# Patient Record
Sex: MALE | Race: WHITE | NOT HISPANIC OR LATINO | Employment: OTHER | URBAN - METROPOLITAN AREA
[De-identification: names, ages, dates, MRNs, and addresses within clinical notes are randomized per-mention and may not be internally consistent; named-entity substitution may affect disease eponyms.]

---

## 2021-05-31 ENCOUNTER — RECORDS - HEALTHEAST (OUTPATIENT)
Dept: ADMINISTRATIVE | Facility: CLINIC | Age: 71
End: 2021-05-31

## 2023-10-25 ENCOUNTER — APPOINTMENT (OUTPATIENT)
Dept: GENERAL RADIOLOGY | Facility: CLINIC | Age: 73
End: 2023-10-25
Attending: STUDENT IN AN ORGANIZED HEALTH CARE EDUCATION/TRAINING PROGRAM
Payer: COMMERCIAL

## 2023-10-25 ENCOUNTER — HOSPITAL ENCOUNTER (EMERGENCY)
Facility: CLINIC | Age: 73
Discharge: HOME | End: 2023-10-25
Attending: STUDENT IN AN ORGANIZED HEALTH CARE EDUCATION/TRAINING PROGRAM | Admitting: STUDENT IN AN ORGANIZED HEALTH CARE EDUCATION/TRAINING PROGRAM
Payer: COMMERCIAL

## 2023-10-25 VITALS
DIASTOLIC BLOOD PRESSURE: 93 MMHG | TEMPERATURE: 99.6 F | BODY MASS INDEX: 27.1 KG/M2 | OXYGEN SATURATION: 99 % | SYSTOLIC BLOOD PRESSURE: 156 MMHG | HEIGHT: 75 IN | HEART RATE: 118 BPM | RESPIRATION RATE: 14 BRPM | WEIGHT: 218 LBS

## 2023-10-25 DIAGNOSIS — S62.639B OPEN FRACTURE OF TUFT OF DISTAL PHALANX OF FINGER: ICD-10-CM

## 2023-10-25 DIAGNOSIS — S61.212A LACERATION OF RIGHT MIDDLE FINGER WITHOUT FOREIGN BODY WITHOUT DAMAGE TO NAIL, INITIAL ENCOUNTER: ICD-10-CM

## 2023-10-25 PROCEDURE — 73130 X-RAY EXAM OF HAND: CPT | Mod: RT

## 2023-10-25 PROCEDURE — 26765 TREAT FINGER FRACTURE EACH: CPT | Mod: F7

## 2023-10-25 PROCEDURE — 250N000013 HC RX MED GY IP 250 OP 250 PS 637: Performed by: STUDENT IN AN ORGANIZED HEALTH CARE EDUCATION/TRAINING PROGRAM

## 2023-10-25 PROCEDURE — 99284 EMERGENCY DEPT VISIT MOD MDM: CPT | Mod: 25

## 2023-10-25 RX ORDER — CEPHALEXIN 500 MG/1
500 CAPSULE ORAL 4 TIMES DAILY
Qty: 20 CAPSULE | Refills: 0 | Status: SHIPPED | OUTPATIENT
Start: 2023-10-25 | End: 2023-10-25

## 2023-10-25 RX ORDER — CEPHALEXIN 500 MG/1
500 CAPSULE ORAL ONCE
Status: COMPLETED | OUTPATIENT
Start: 2023-10-25 | End: 2023-10-25

## 2023-10-25 RX ORDER — CEPHALEXIN 500 MG/1
500 CAPSULE ORAL 4 TIMES DAILY
Qty: 20 CAPSULE | Refills: 0 | Status: SHIPPED | OUTPATIENT
Start: 2023-10-25

## 2023-10-25 RX ADMIN — CEPHALEXIN 500 MG: 500 CAPSULE ORAL at 21:36

## 2023-10-25 ASSESSMENT — ACTIVITIES OF DAILY LIVING (ADL): ADLS_ACUITY_SCORE: 35

## 2023-10-26 NOTE — DISCHARGE INSTRUCTIONS
Please make an appointment to follow up with [facility] - [phone] - [address] [place] [address] in 5 days even if entirely better.    Continue use of tylenol and ibuprofen at home for pain. Keep wounds clean, dry, and covered. Wear finger splints until seen by orthopedics.

## 2023-10-26 NOTE — ED PROVIDER NOTES
Emergency Department Attending Supervision Note  10/18/2022  11:18 AM      I evaluated this patient in conjunction with Lani Crump PA-C      Briefly, the patient presented with cuts to 2 of the fingers on his right hand after he put his hand into the mower to do some work.  Tetanus is up-to-date.  He is right-handed.    On my exam, patient has contusions to the tips of both index and third fingers on the right hand.  There are some small superficial lacerations around the nail on both fingers.  Capillary refill and sensation.    ED course: The patient was sent down for x-ray of the fingers which shows a comminuted tuft fracture of the third finger.  The wounds were cleaned and dressed and splint was placed onto the third finger.  Ice and elevate and ibuprofen.  Would recommend antibiotics because there are some lacerations over this fracture.  Follow-up in the clinic with Ortho or primary care doctor.    Diagnosis:      ICD-10-CM    1. Laceration of right middle finger without foreign body without damage to nail, initial encounter  S61.212A       2. Open fracture of tuft of distal phalanx of finger  S62.639B               Liana Wong MD Powell, Tracy Alan, MD  10/25/23 2122

## 2023-10-26 NOTE — ED PROVIDER NOTES
"  History     Chief Complaint:  Hand Injury       HPI   Miles Emmanuel is a 72 year old male who presents with finger lacerations. Patient states he was using  which got jammed. He inserted index and middle finger of right hand and sustained small lacerations and abrasions around nail of these two fingers. This occurred around 2:30 PM today. Reports numbness and discomfort rating 2/10 in severity. Tdap up to date he believes.       Independent Historian:   None - Patient Only    Review of External Notes:   [facility] reviewed - tdap in 2022       Medications:    cephALEXin (KEFLEX) 500 MG capsule        Past Medical History:    No past medical history on file.    Past Surgical History:    No past surgical history on file.     Physical Exam   Patient Vitals for the past 24 hrs:   BP Temp Temp src Pulse Resp SpO2 Height Weight   10/25/23 2009 (!) 156/93 99.6  F (37.6  C) Oral 118 14 99 % 1.905 m (6' 3\") 98.9 kg (218 lb)        Physical Exam  General: Alert and cooperative with exam. Patient in no apparent distress. Normal mentation.  Head:  Scalp is NC/AT  Eyes:  No scleral icterus, PERRL  ENT:  The external nose and ears are normal.  Neck:  Normal range of motion without rigidity.  CV:  Warm and well perfused  Resp:  Breathing comfortably on room air  MS:  Full ROM of right hand index and middle finger against resistance  Skin:  Small laceration to 3rd digit of right hand on lateral finger tip, small abrasion under 3rd digit nailbed. Small abrasion to 2nd digit along lateral side of nail bed. Bleeding controlled. Slight ecchymosis noted to fingertips.   Neuro:  Oriented x 3. No gross motor deficits.      Emergency Department Course   Imaging:  XR Hand Right G/E 3 Views   Final Result   IMPRESSION: Nondisplaced fracture of the tuft of the distal phalanx of the long finger. There is some slight irregularity along the ulnar margin of the tuft of the distal phalanx of the index finger which could represent additional " fracture although this    may be more chronic and is not seen on all projections. No evidence for foreign body. No dislocation. Right hand otherwise negative.         Laboratory:  Labs Ordered and Resulted from Time of ED Arrival to Time of ED Departure - No data to display     Procedures   None    Emergency Department Course & Assessments:         Interventions:  Medications   cephALEXin (KEFLEX) capsule 500 mg (has no administration in time range)          Independent Interpretation (X-rays, CTs, rhythm strip):  Finger imaging - 3rd digit tuft fracture present    Consultations/Discussion of Management or Tests:  None        Social Determinants of Health affecting care:   None    Disposition:  The patient was discharged to home.     Impression & Plan      Medical Decision Making:  Miles Emmanuel is a 72 year old male presents for evaluation of a laceration to right hand 2nd and 3rd digits as sustained as noted in the HPI. After copious irrigation, the wound was carefully evaluated and explored. There was no foreign body identified. CMS is intact.  There is no evidence of muscular, tendon, bone, or nerve damage with this laceration.  The laceration to lateral 3rd digit closed with skin glue and bandage. Finger xrays with evidence of tuft fracture to right 3rd DIP and question of possible tuft fracture to 2nd DIP.  Possible complications (infection, scarring) were reviewed with the patient. Appropriate wound dressing was placed and daily cares were discussed. Splint placed on right 2nd and 3rd digits. Tetanus up to date. he will be discharged home. Red flag symptoms, and reasons for return were discussed and understood. Rx for keflex sent to patient's pharmacy. Follow up with [facility] next week. All questions were answered prior to discharge. The patient understands and agrees to this plan.      Diagnosis:    ICD-10-CM    1. Laceration of right middle finger without foreign body without damage to nail, initial encounter   S61.212A       2. Open fracture of tuft of distal phalanx of finger  S62.639B            Discharge Medications:  New Prescriptions    CEPHALEXIN (KEFLEX) 500 MG CAPSULE    Take 1 capsule (500 mg) by mouth 4 times daily for 5 days          10/25/2023   Lani Crump, Lani Acuna PA-C  10/25/23 2111

## 2023-10-27 ENCOUNTER — NURSE TRIAGE (OUTPATIENT)
Dept: NURSING | Facility: CLINIC | Age: 73
End: 2023-10-27
Payer: COMMERCIAL

## 2023-10-27 NOTE — TELEPHONE ENCOUNTER
Nurse Triage SBAR    Is this a 2nd Level Triage? NO    Situation: Can't get bandage off.    Background: Per pt/chart, pt was in the ED on 10/25/2023 for Laceration of right middle finger. Per pt/chart, pt had skin glue with dressing applied to finger.Per pt he was given additional dressings/wound care supplies and is not able to get off exterior dressing applied in ED.    Assessment: Pt denied any acute distress. Pt while on phone with FNA was able to remove exterior dressing, verbalizing no other needs at this time.    Protocol Recommended Disposition:   No disposition on file. Signs of infection to laceration site from AVS is reviewed with pt as well scheduling follow up with orthopedic surgery in 5 days as written on AVS. Pt denied any signs of infection and verbalized he was discharged from ED with antibiotics also.    Recommendation:           Does the patient meet one of the following criteria for ADS visit consideration? 16+ years old, no PCP (internal or external) but seen at [facility]     TIP  Providers, please consider if this condition is appropriate for management at one of our Acute and Diagnostic Services sites.     If patient is a good candidate, please use dotphrase <dot>triageresponse and select Refer to ADS to document.    Additional Information   Negative: General information question, no triage required and triager able to answer question    Protocols used: Information Only Call - No Triage-A-